# Patient Record
Sex: FEMALE | Race: WHITE | NOT HISPANIC OR LATINO | Employment: FULL TIME | URBAN - METROPOLITAN AREA
[De-identification: names, ages, dates, MRNs, and addresses within clinical notes are randomized per-mention and may not be internally consistent; named-entity substitution may affect disease eponyms.]

---

## 2019-02-14 ENCOUNTER — HOSPITAL ENCOUNTER (EMERGENCY)
Facility: HOSPITAL | Age: 39
Discharge: HOME | End: 2019-02-14
Attending: EMERGENCY MEDICINE
Payer: COMMERCIAL

## 2019-02-14 VITALS
SYSTOLIC BLOOD PRESSURE: 117 MMHG | HEART RATE: 80 BPM | TEMPERATURE: 98 F | DIASTOLIC BLOOD PRESSURE: 77 MMHG | RESPIRATION RATE: 19 BRPM | OXYGEN SATURATION: 100 %

## 2019-02-14 DIAGNOSIS — Z72.820 SLEEP DEPRIVATION: ICD-10-CM

## 2019-02-14 DIAGNOSIS — R56.9 SEIZURE: Primary | ICD-10-CM

## 2019-02-14 PROCEDURE — 99283 EMERGENCY DEPT VISIT LOW MDM: CPT

## 2019-02-14 PROCEDURE — 25000003 PHARM REV CODE 250: Performed by: EMERGENCY MEDICINE

## 2019-02-14 RX ORDER — ESCITALOPRAM OXALATE 20 MG/1
20 TABLET ORAL DAILY
COMMUNITY

## 2019-02-14 RX ORDER — LAMOTRIGINE 100 MG/1
100 TABLET ORAL DAILY
Qty: 10 TABLET | Refills: 0 | Status: SHIPPED | OUTPATIENT
Start: 2019-02-14 | End: 2020-02-14

## 2019-02-14 RX ORDER — LAMOTRIGINE 100 MG/1
100 TABLET ORAL DAILY
COMMUNITY

## 2019-02-14 RX ORDER — LAMOTRIGINE 100 MG/1
100 TABLET ORAL ONCE
Status: COMPLETED | OUTPATIENT
Start: 2019-02-14 | End: 2019-02-14

## 2019-02-14 RX ADMIN — LAMOTRIGINE 100 MG: 100 TABLET ORAL at 05:02

## 2019-02-14 NOTE — ED PROVIDER NOTES
Encounter Date: 2/14/2019    SCRIBE #1 NOTE: I, Lisa Zuñiga, am scribing for, and in the presence of,  Dr. Casiano. I have scribed the entire note.       History     Chief Complaint   Patient presents with    Seizures     States h/o seizures, compliant with Lamictyl. States had a seizure during a flight from [place]. Admits to little rest, increased caffeine, and stress caused by verbal altercation with employer. States previous seizures have occurred after stressful situation. Presents awake, alert, oriented. Skin w/d. Denies pain. c/o nausea. No distress.      Molly Zambrano is a 38 y.o. female who  has a past medical history of Seizures.     The patient presents to the ED due to seizures. She reports onset of symptoms was a few hours ago. The patient states she was on a plane when she fell asleep. She notes a witness reported she had a seizure on the plane. The patient denies any loss of bowel/bladder control or lip biting. She notes currently feels nauseous and weak. The patient has been compliant with her Lamictal 100 mg. She states she last had a seizure in fall 2018. The patient reports previous seizures occurred due to decreased rest, increased stress and caffeine use which she states is true for today.       The history is provided by the patient.     Review of patient's allergies indicates:  No Known Allergies  Past Medical History:   Diagnosis Date    Seizures      Past Surgical History:   Procedure Laterality Date    ANKLE SURGERY Left     BREAST SURGERY      augmentation     History reviewed. No pertinent family history.  Social History     Tobacco Use    Smoking status: Former Smoker   Substance Use Topics    Alcohol use: No     Frequency: Never    Drug use: No     Review of Systems   Constitutional: Negative for chills and fever.   HENT: Negative for congestion, rhinorrhea and sore throat.    Eyes: Negative for redness and visual disturbance.   Respiratory: Negative for cough, shortness of  breath and wheezing.    Cardiovascular: Negative for chest pain and palpitations.   Gastrointestinal: Negative for abdominal pain, diarrhea, nausea and vomiting.   Genitourinary: Negative for dysuria and hematuria.   Musculoskeletal: Negative for back pain, myalgias and neck pain.   Skin: Negative for rash.   Neurological: Positive for seizures. Negative for dizziness, weakness and light-headedness.   Psychiatric/Behavioral: Negative for confusion.       Physical Exam     Initial Vitals   BP Pulse Resp Temp SpO2   -- -- -- -- --      MAP       --         Physical Exam    Nursing note and vitals reviewed.  Constitutional: She appears well-developed and well-nourished. She is not diaphoretic. No distress.   HENT:   Head: Normocephalic and atraumatic.   Mouth/Throat: Oropharynx is clear and moist.   Eyes: Conjunctivae and EOM are normal. Pupils are equal, round, and reactive to light.   Neck: Normal range of motion. Neck supple.   Cardiovascular: Normal rate, regular rhythm and normal heart sounds. Exam reveals no gallop and no friction rub.    No murmur heard.  Pulmonary/Chest: Breath sounds normal. She has no wheezes. She has no rhonchi. She has no rales.   Abdominal: Soft. Bowel sounds are normal. There is no tenderness. There is no rebound and no guarding.   Musculoskeletal: Normal range of motion. She exhibits no edema or tenderness.   Lymphadenopathy:     She has no cervical adenopathy.   Neurological: She is alert and oriented to person, place, and time. She has normal strength.   Cranial nerve deficits. No focal deficits   Skin: Skin is warm and dry. Capillary refill takes less than 2 seconds. No rash noted.         ED Course   Procedures  Labs Reviewed - No data to display       Imaging Results    None          Medical Decision Making:   ED Management:  38-year-old female with seizure disorder who had a seizure while seated on an airplane.  Patient admits to compliance with Lamictal, with her last dose being  last night.  Patient says she has been sleep deprived due to traveling and has also been stressed out.  She says these factors usually bring on a seizure.  She currently has no complaints. Patient was given a dose of Lamictal here in the ED.  I do not see the need for lab work or imaging.  Patient is concerned about having enough of her medication while in town.  I will write her a prescription for additional Lamictal.  She will follow up with her physician upon arrival home as needed.                      Clinical Impression:     1. Seizure    2. Sleep deprivation           I, Dr. Huseyin Casiano, personally performed the services described in this documentation. All medical record entries made by the scribe were at my direction and in my presence. I have reviewed the chart and agree that the record reflects my personal performance and is accurate and complete. Huseyin Casiano MD.  5:05 PM 02/14/2019                     Huseyin Casiano MD  02/14/19 [id number]

## 2019-02-14 NOTE — ED NOTES
Pt to Room 6 with c/o seizure while on her airplane. Pt states she has a history of seizures and takes medication but has been having a high level of stress. Pt complains of nausea. Pt denies any vomiting, diarrhea, constipation, dizziness, weakness, headache, chest pain, SOB or vision changes. Pt denies any other symptoms. Pt is AAO x 3. Respirations even and unlabored, lung sounds clear and equal bilaterally. Skin warm and dry to touch, no swelling noted. Abdomen soft and non-distended, no guarding or tenderness noted. Family at the bedside. Will continue to monitor closely.